# Patient Record
Sex: FEMALE | Race: WHITE | NOT HISPANIC OR LATINO | Employment: FULL TIME | URBAN - METROPOLITAN AREA
[De-identification: names, ages, dates, MRNs, and addresses within clinical notes are randomized per-mention and may not be internally consistent; named-entity substitution may affect disease eponyms.]

---

## 2017-03-07 ENCOUNTER — LAB VISIT (OUTPATIENT)
Dept: LAB | Facility: HOSPITAL | Age: 27
End: 2017-03-07
Attending: NURSE PRACTITIONER
Payer: MEDICAID

## 2017-03-07 ENCOUNTER — OFFICE VISIT (OUTPATIENT)
Dept: OBSTETRICS AND GYNECOLOGY | Facility: CLINIC | Age: 27
End: 2017-03-07
Payer: MEDICAID

## 2017-03-07 VITALS
HEIGHT: 66 IN | BODY MASS INDEX: 42.73 KG/M2 | SYSTOLIC BLOOD PRESSURE: 122 MMHG | DIASTOLIC BLOOD PRESSURE: 78 MMHG | WEIGHT: 265.88 LBS

## 2017-03-07 DIAGNOSIS — Z34.81 ENCOUNTER FOR SUPERVISION OF OTHER NORMAL PREGNANCY IN FIRST TRIMESTER: ICD-10-CM

## 2017-03-07 DIAGNOSIS — Z34.81 ENCOUNTER FOR SUPERVISION OF OTHER NORMAL PREGNANCY IN FIRST TRIMESTER: Primary | ICD-10-CM

## 2017-03-07 DIAGNOSIS — O99.211 OBESITY AFFECTING PREGNANCY IN FIRST TRIMESTER: ICD-10-CM

## 2017-03-07 LAB
ABO + RH BLD: NORMAL
BASOPHILS # BLD AUTO: 0.03 K/UL
BASOPHILS NFR BLD: 0.3 %
BLD GP AB SCN CELLS X3 SERPL QL: NORMAL
DIFFERENTIAL METHOD: ABNORMAL
EOSINOPHIL # BLD AUTO: 0.1 K/UL
EOSINOPHIL NFR BLD: 0.6 %
ERYTHROCYTE [DISTWIDTH] IN BLOOD BY AUTOMATED COUNT: 13.1 %
HCT VFR BLD AUTO: 37.6 %
HGB BLD-MCNC: 12.5 G/DL
LYMPHOCYTES # BLD AUTO: 3.1 K/UL
LYMPHOCYTES NFR BLD: 29.2 %
MCH RBC QN AUTO: 27.4 PG
MCHC RBC AUTO-ENTMCNC: 33.2 %
MCV RBC AUTO: 83 FL
MONOCYTES # BLD AUTO: 0.6 K/UL
MONOCYTES NFR BLD: 5.6 %
NEUTROPHILS # BLD AUTO: 6.9 K/UL
NEUTROPHILS NFR BLD: 64.1 %
PLATELET # BLD AUTO: 393 K/UL
PMV BLD AUTO: 9.5 FL
RBC # BLD AUTO: 4.56 M/UL
WBC # BLD AUTO: 10.77 K/UL

## 2017-03-07 PROCEDURE — 86762 RUBELLA ANTIBODY: CPT

## 2017-03-07 PROCEDURE — 86703 HIV-1/HIV-2 1 RESULT ANTBDY: CPT

## 2017-03-07 PROCEDURE — 86900 BLOOD TYPING SEROLOGIC ABO: CPT

## 2017-03-07 PROCEDURE — 87340 HEPATITIS B SURFACE AG IA: CPT

## 2017-03-07 PROCEDURE — 86850 RBC ANTIBODY SCREEN: CPT

## 2017-03-07 PROCEDURE — 86592 SYPHILIS TEST NON-TREP QUAL: CPT

## 2017-03-07 PROCEDURE — 99999 PR PBB SHADOW E&M-EST. PATIENT-LVL II: CPT | Mod: PBBFAC,,, | Performed by: NURSE PRACTITIONER

## 2017-03-07 PROCEDURE — 99203 OFFICE O/P NEW LOW 30 MIN: CPT | Mod: TH,S$PBB,, | Performed by: NURSE PRACTITIONER

## 2017-03-07 PROCEDURE — 85025 COMPLETE CBC W/AUTO DIFF WBC: CPT

## 2017-03-07 NOTE — MR AVS SNAPSHOT
"    [facility]  [address]  [place] [address]  Phone: [phone]  Fax: [phone]                  Deandra Carias   3/7/2017 2:15 PM   Office Visit    Description:  Female : 1990   Provider:  Saira Rivera NP   Department:  [facility]           Reason for Visit     Possible Pregnancy           Diagnoses this Visit        Comments    Encounter for supervision of other normal pregnancy in first trimester    -  Primary     Obesity affecting pregnancy in first trimester                To Do List           Future Appointments        Provider Department Dept Phone    3/14/2017 8:30 AM [facility] -[phone]    3/14/2017 9:15 AM Rossy Pradhan CNM Pending sale to [facility] OB/ -[phone]      Goals (5 Years of Data)     None      [facility]     [facility] Nurse [facility] Line -  Assistance  Registered nurses in the [facility] provide clinical advisement, health education, appointment booking, and other advisory services.  Call for this free service at [phone].             Medications           Message regarding Medications     Verify the changes and/or additions to your medication regime listed below are the same as discussed with your clinician today.  If any of these changes or additions are incorrect, please notify your healthcare provider.             Verify that the below list of medications is an accurate representation of the medications you are currently taking.  If none reported, the list may be blank. If incorrect, please contact your healthcare provider. Carry this list with you in case of emergency.           Current Medications     PHOSP ACID/DEXTROSE/FRUCTOSE (NAUSEA RELIEF ORAL) Take by mouth daily as needed.    PNV62/FA/OM3/DHA/EPA/FISH OIL (PRENATAL GUMMY ORAL) Take by mouth once daily.           Clinical Reference Information           Your Vitals Were     BP Height Weight Last Period BMI    122/78 5' 6" " (1.676 m) 120.6 kg (265 lb 14 oz) 02/01/2017 (Approximate) 42.91 kg/m2      Blood Pressure          Most Recent Value    BP  122/78      Allergies as of 3/7/2017     Asa [Aspirin]    Sulfa (Sulfonamide Antibiotics)      Immunizations Administered on Date of Encounter - 3/7/2017     None      Orders Placed During Today's Visit      Normal Orders This Visit    C. trachomatis/N. gonorrhoeae by AMP DNA Cervicovaginal     Liquid-based pap smear, screening     POCT urine pregnancy     Urinalysis     Urine culture     Future Labs/Procedures Expected by Expires    CBC auto differential  3/7/2017 5/6/2018    Hepatitis B surface antigen  3/7/2017 5/6/2018    HIV-1 and HIV-2 antibodies  3/7/2017 5/6/2018    RPR  3/7/2017 5/6/2018    Rubella antibody, IgG  3/7/2017 5/6/2018    Type & Screen - Ob Profile  3/7/2017 5/6/2018    US OB/GYN Procedure ([facility])  As directed 3/7/2018      Language Assistance Services     ATTENTION: Language assistance services are available, free of charge. Please call [phone].      ATENCIÓN: Si habla español, tiene a perdue disposición servicios gratuitos de asistencia lingüística. Llame al [phone].     CHÚ Ý: N?u b?n nói Ti?ng Vi?t, có các d?ch v? h? tr? ngôn ng? mi?n phí dành cho b?n. G?i s? [phone].         [facility] complies with applicable Federal civil rights laws and does not discriminate on the basis of race, color, national origin, age, disability, or sex.

## 2017-03-07 NOTE — PROGRESS NOTES
"CC: Risk assessement    Deandra Carias is a 26 y.o. female  presents for risk assessment.  LMP: Patient's last menstrual period was 2017 (approximate). Different FOB. WINDY 2017. 4 w 6 d. Last pap exam . OB h/o one term vaginal delivery.     Past Medical History:   Diagnosis Date    Abnormal Pap smear of cervix      Past Surgical History:   Procedure Laterality Date    APPENDECTOMY       Social History     Social History    Marital status: Single     Spouse name: N/A    Number of children: N/A    Years of education: N/A     Occupational History    Not on file.     Social History Main Topics    Smoking status: Never Smoker    Smokeless tobacco: Not on file    Alcohol use No    Drug use: Yes     Special: Marijuana      Comment: Pt. reports using marijuana 4 days ago 3/3/2017    Sexual activity: Yes     Partners: Male     Birth control/ protection: None     Other Topics Concern    Not on file     Social History Narrative     Family History   Problem Relation Age of Onset    Breast cancer Maternal Grandmother     Colon cancer Neg Hx     Ovarian cancer Neg Hx     Cancer Neg Hx      OB History      Para Term  AB TAB SAB Ectopic Multiple Living    2 1 1       1          /78  Ht 5' 6" (1.676 m)  Wt 120.6 kg (265 lb 14 oz)  LMP 2017 (Approximate)  BMI 42.91 kg/m2      ROS:  GENERAL: Denies weight gain or weight loss. Feeling well overall.   SKIN: Denies rash or lesions.   HEAD: Denies head injury or headache.   NODES: Denies enlarged lymph nodes.   CHEST: Denies chest pain or shortness of breath.   CARDIOVASCULAR: Denies palpitations or left sided chest pain.   ABDOMEN: No abdominal pain, constipation, diarrhea, nausea, vomiting or rectal bleeding.   URINARY: No frequency, dysuria, hematuria, or burning on urination.  REPRODUCTIVE: See HPI.   BREASTS: The patient performs breast self-examination and denies pain, lumps, or nipple discharge.   HEMATOLOGIC: " No easy bruisability or excessive bleeding.   MUSCULOSKELETAL: Denies joint pain or swelling.   NEUROLOGIC: Denies syncope or weakness.   PSYCHIATRIC: Denies depression, anxiety or mood swings.    PHYSICAL EXAM:  APPEARANCE: Obese female, in no acute distress.  AFFECT: WNL, alert and oriented x 3  SKIN: No acne or hirsutism  NECK: Neck symmetric without masses or thyromegaly  NODES: No inguinal, cervical, axillary, or femoral lymph node enlargement  CHEST: Good respiratory effect  ABDOMEN: Soft.  No tenderness or masses.   PELVIC: Normal external genitalia without lesions.  Normal hair distribution.  Adequate perineal body, normal urethral meatus.  Vagina moist and well rugated without lesions or discharge.  Cervix pink, without lesions, discharge or tenderness.    Bimanual exam shows uterus to be  5 weeks size, regular, mobile and nontender.  Adnexa without masses or tenderness.    EXTREMITIES: No edema.    1. Encounter for supervision of other normal pregnancy in first trimester  C. trachomatis/N. gonorrhoeae by AMP DNA Cervicovaginal    CBC auto differential    Hepatitis B surface antigen    HIV-1 and HIV-2 antibodies    Liquid-based pap smear, screening    POCT urine pregnancy    RPR    Rubella antibody, IgG    Type & Screen - Ob Profile    Urinalysis    Urine culture    US OB/GYN Procedure ([facility])    PLAN:  Initial labs  Initial ultrasound and visit

## 2017-03-08 LAB
AMORPH CRY UR QL COMP ASSIST: ABNORMAL
BACTERIA #/AREA URNS AUTO: ABNORMAL /HPF
BILIRUB UR QL STRIP: NEGATIVE
C TRACH DNA SPEC QL NAA+PROBE: NEGATIVE
CLARITY UR REFRACT.AUTO: ABNORMAL
COLOR UR AUTO: YELLOW
GLUCOSE UR QL STRIP: NEGATIVE
HBV SURFACE AG SERPL QL IA: NEGATIVE
HGB UR QL STRIP: NEGATIVE
HIV 1+2 AB+HIV1 P24 AG SERPL QL IA: NEGATIVE
KETONES UR QL STRIP: NEGATIVE
LEUKOCYTE ESTERASE UR QL STRIP: NEGATIVE
MICROSCOPIC COMMENT: ABNORMAL
N GONORRHOEA DNA SPEC QL NAA+PROBE: NEGATIVE
NITRITE UR QL STRIP: NEGATIVE
PH UR STRIP: 6 [PH] (ref 5–8)
PROT UR QL STRIP: NEGATIVE
RPR SER QL: NORMAL
SP GR UR STRIP: 1.02 (ref 1–1.03)
SQUAMOUS #/AREA URNS AUTO: 17 /HPF
URN SPEC COLLECT METH UR: ABNORMAL
UROBILINOGEN UR STRIP-ACNC: NEGATIVE EU/DL

## 2017-03-09 LAB
BACTERIA UR CULT: NORMAL
BACTERIA UR CULT: NORMAL

## 2017-03-10 LAB
RUBV IGG SER-ACNC: 10.4 IU/ML
RUBV IGG SER-IMP: REACTIVE

## 2017-03-14 ENCOUNTER — TELEPHONE (OUTPATIENT)
Dept: OBSTETRICS AND GYNECOLOGY | Facility: CLINIC | Age: 27
End: 2017-03-14

## 2017-03-14 NOTE — TELEPHONE ENCOUNTER
----- Message from Shruthi Banks sent at 3/14/2017  8:20 AM CDT -----  Contact: Pt   Pt called and stated she was returning a call to the nurse. She stated that she is in route to her appt and is on traffic. She can be reached at [phone] (home).    Thanks,  TF

## 2017-03-14 NOTE — TELEPHONE ENCOUNTER
Patient was rescheduled for her newob appt per Rossy pt was to early for dating ultrasound, she is only 5 weeks, needs to be at least 8 weeks for an accurate reading.

## 2017-04-11 ENCOUNTER — INITIAL PRENATAL (OUTPATIENT)
Dept: OBSTETRICS AND GYNECOLOGY | Facility: CLINIC | Age: 27
End: 2017-04-11
Payer: MEDICAID

## 2017-04-11 ENCOUNTER — PROCEDURE VISIT (OUTPATIENT)
Dept: OBSTETRICS AND GYNECOLOGY | Facility: CLINIC | Age: 27
End: 2017-04-11
Payer: MEDICAID

## 2017-04-11 ENCOUNTER — TELEPHONE (OUTPATIENT)
Dept: OBSTETRICS AND GYNECOLOGY | Facility: CLINIC | Age: 27
End: 2017-04-11

## 2017-04-11 VITALS
WEIGHT: 262.13 LBS | DIASTOLIC BLOOD PRESSURE: 74 MMHG | BODY MASS INDEX: 42.31 KG/M2 | SYSTOLIC BLOOD PRESSURE: 122 MMHG

## 2017-04-11 DIAGNOSIS — Z34.81 ENCOUNTER FOR SUPERVISION OF OTHER NORMAL PREGNANCY IN FIRST TRIMESTER: ICD-10-CM

## 2017-04-11 PROCEDURE — 99202 OFFICE O/P NEW SF 15 MIN: CPT | Mod: TH,S$PBB,, | Performed by: ADVANCED PRACTICE MIDWIFE

## 2017-04-11 PROCEDURE — 99212 OFFICE O/P EST SF 10 MIN: CPT | Mod: PBBFAC,25 | Performed by: ADVANCED PRACTICE MIDWIFE

## 2017-04-11 PROCEDURE — 76801 OB US < 14 WKS SINGLE FETUS: CPT | Mod: 26,S$PBB,, | Performed by: OBSTETRICS & GYNECOLOGY

## 2017-04-11 PROCEDURE — 99999 PR PBB SHADOW E&M-EST. PATIENT-LVL II: CPT | Mod: PBBFAC,,, | Performed by: ADVANCED PRACTICE MIDWIFE

## 2017-04-11 NOTE — MR AVS SNAPSHOT
[facility]  [address]  [place] [address]  Phone: [phone]  Fax: [phone]                  Deandra Carias   2017 9:30 AM   Initial Prenatal    Description:  Female : 1990   Provider:  Rossy Pradhan CNM   Department:  [facility]           Reason for Visit     Initial Prenatal Visit           Diagnoses this Visit        Comments    Evaluate anatomy not seen on prior sonogram    -  Primary            To Do List           Future Appointments        Provider Department Dept Phone    2017 9:00 AM ULTRASOUND, OB[facility]N [facility]/ -[phone]    2017 9:45 AM [facility] -[phone]      Goals (5 Years of Data)     None      [facility]     [facility] Care Line -  Assistance  Unless otherwise directed by your provider, please contact [facility], our nurse care line that is available for  assistance.     Registered nurses in the [facility] provide: appointment scheduling, clinical advisement, health education, and other advisory services.  Call: [phone] (toll free)               Medications           Message regarding Medications     Verify the changes and/or additions to your medication regime listed below are the same as discussed with your clinician today.  If any of these changes or additions are incorrect, please notify your healthcare provider.             Verify that the below list of medications is an accurate representation of the medications you are currently taking.  If none reported, the list may be blank. If incorrect, please contact your healthcare provider. Carry this list with you in case of emergency.           Current Medications     PHOSP ACID/DEXTROSE/FRUCTOSE (NAUSEA RELIEF ORAL) Take by mouth daily as needed.    PNV62/FA/OM3/DHA/EPA/FISH OIL (PRENATAL GUMMY ORAL) Take by mouth once daily.           Clinical Reference Information           Prenatal Vitals      Enc. Date GA Prenatal Vitals Prenatal Pulse Pain Level Urine Albumin/Glucose Edema Presentation Dilation/Effacement/Station    4/11/17 9w6d 122/74 / 118.9 kg (262 lb 2 oz)  / us            Your Vitals Were     BP Weight Last Period BMI       122/74 118.9 kg (262 lb 2 oz) 02/01/2017 (Approximate) 42.31 kg/m2       Allergies as of 4/11/2017     Asa [Aspirin]    Sulfa (Sulfonamide Antibiotics)      Immunizations Administered on Date of Encounter - 4/11/2017     None      Orders Placed During Today's Visit     Future Labs/Procedures Expected by Expires    US OB/GYN Procedure ([facility])  As directed 4/11/2018      Language Assistance Services     ATTENTION: Language assistance services are available, free of charge. Please call [phone].      ATENCIÓN: Si habla ewaañol, tiene a perdue disposición servicios gratuitos de asistencia lingüística. Llame al [phone].     CHÚ Ý: N?u b?n nói Ti?ng Vi?t, có các d?ch v? h? tr? ngôn ng? mi?n phí dành cho b?n. G?i s? [phone].         [facility] complies with applicable Federal civil rights laws and does not discriminate on the basis of race, color, national origin, age, disability, or sex.

## 2017-04-11 NOTE — TELEPHONE ENCOUNTER
Attempted to call pt, left message for pt to return call.  Pt needs to know that ultrasound was moved to May 3rd at the [facility] for further imaging with MFM.

## 2017-04-11 NOTE — PROGRESS NOTES
Oriented to our practice. [place] book and blue bag info reviewed. zika and dietary recommendations made. Works in a kitchen, [employer], food eversions present. Reviewed US and need for follow up to further assess anatomical structures. Will wait for Dr. Armendariz's recommendations. al

## 2017-04-11 NOTE — TELEPHONE ENCOUNTER
----- Message from Barrington Deluna sent at 4/11/2017 11:58 AM CDT -----  Contact: Patient  Pt missed a call and would like a return call @ ..[phone] (home) Thank you/NH

## 2017-04-11 NOTE — TELEPHONE ENCOUNTER
----- Message from Karishma Byers sent at 4/11/2017 11:24 AM CDT -----  Patient is returning your call regarding your her follow up appointment.   Call her at [phone].                                                   garcia

## 2017-04-28 ENCOUNTER — PATIENT MESSAGE (OUTPATIENT)
Dept: OBSTETRICS AND GYNECOLOGY | Facility: CLINIC | Age: 27
End: 2017-04-28

## 2017-05-01 ENCOUNTER — TELEPHONE (OUTPATIENT)
Dept: OBSTETRICS AND GYNECOLOGY | Facility: CLINIC | Age: 27
End: 2017-05-01

## 2017-05-01 NOTE — TELEPHONE ENCOUNTER
----- Message from Aleida Duncan MA sent at 5/1/2017 11:16 AM CDT -----  Contact: pt- [phone]      ----- Message -----     From: Renée Marin     Sent: 5/1/2017   9:13 AM       To: Kala SAN Staff    Pt cx appt for U/S this Wednesday but later realized reason for appt.  Pt is concerned something may be wrong with baby and wants to speak with nurse.

## 2017-05-01 NOTE — TELEPHONE ENCOUNTER
Spoke with pt  States she canceled MFM appt because she wasn't sure why the appt was originally scheduled as well as a f/u.     Pt request to r/s mfm and f/u back for originally dates. appts changed Patient verbalized understanding

## 2017-05-03 ENCOUNTER — OFFICE VISIT (OUTPATIENT)
Dept: OBSTETRICS AND GYNECOLOGY | Facility: CLINIC | Age: 27
End: 2017-05-03
Payer: MEDICAID

## 2017-05-03 DIAGNOSIS — O35.02X0 ANENCEPHALY OF FETUS AFFECTING SINGLETON PREGNANCY: ICD-10-CM

## 2017-05-03 PROCEDURE — 76805 OB US >/= 14 WKS SNGL FETUS: CPT | Mod: PBBFAC,PO | Performed by: OBSTETRICS & GYNECOLOGY

## 2017-05-03 PROCEDURE — 99203 OFFICE O/P NEW LOW 30 MIN: CPT | Mod: GT,25,TH,S$PBB | Performed by: OBSTETRICS & GYNECOLOGY

## 2017-05-03 PROCEDURE — 76805 OB US >/= 14 WKS SNGL FETUS: CPT | Mod: 26,S$PBB,, | Performed by: OBSTETRICS & GYNECOLOGY

## 2017-05-03 NOTE — PROGRESS NOTES
Chief complaint: Fetal anomaly    26 y.o. 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 There is no history of Colon cancer, Ovarian cancer, or Cancer.    Social history: reports that she has never smoked. She does not have any smokeless tobacco history on file. She reports that she uses illicit drugs, including Marijuana. She reports that she does not drink alcohol.    A detailed fetal anatomical ultrasound was completed today.  See details in imaging section of EPIC.  Although very early in pregnancy, fetal anencephaly is present.        She was seen for counseling due to fetal anencephaly. No other structural abnormalities were noted but survey was incomplete due to early gestation. Pleasesee the ultrasound report for complete details.  We discussed that anencephaly is typically a multifactorial condition, with both environmental and genetic  influences. Rare cases have been reported to be associated with chromosome abnormality. At this time,  invasive testing is not recommended. Recurrence risks are 3-5% for any open neural tube defect, including  anencephaly. Anencephaly is a lethal condition, with approximately 50% of cases being stillborn and the  remaining dying shortly after delivery. We discussed that although a large part of the cerebrum has been  destroyed, the brain stem remains which is why the fetus continues to move.    We discussed that she has the options of continuing the pregnancy  with expectant management or of termination. At this time,  she would like to terminate the pregnancy.     We discussed that in ALL future pregnancies, she should take 4.0 mg of folic acid PRIOR to and DURING pregnancy. We discussed that since many pregnancies are unplanned, she  should make folic acid a part of her daily routine, as supplementation has been shown to decrease the risk for spina bifida by 75%. She expressed understanding.    _ The approximate physician face-to-face time was 30 minutes. The majority of the time (>50%) was spent on counseling of the patient or coordination of care.  Thank you for allowing us to assist in the care of your patient. If you have any questions, please do not hesitate to contact our office.

## 2017-05-04 ENCOUNTER — TELEPHONE (OUTPATIENT)
Dept: OBSTETRICS AND GYNECOLOGY | Facility: CLINIC | Age: 27
End: 2017-05-04

## 2017-05-04 NOTE — TELEPHONE ENCOUNTER
----- Message from Jon Lyons sent at 5/4/2017 12:19 PM CDT -----  Contact: same  Patient called in and would like a call back from nurse or doctor regarding the ultrasound results on the baby as she has questions.    Patient call back number is [phone]

## 2017-05-04 NOTE — TELEPHONE ENCOUNTER
Attempted to contact patient re: u/s results , no answer.  Left patient a voicemail message to call the clinic back.

## 2017-05-05 ENCOUNTER — TELEPHONE (OUTPATIENT)
Dept: OBSTETRICS AND GYNECOLOGY | Facility: CLINIC | Age: 27
End: 2017-05-05

## 2017-05-05 NOTE — TELEPHONE ENCOUNTER
----- Message from Jona Vieyra sent at 5/5/2017  8:16 AM CDT -----  Contact: [phone]   Returning call, please call back @ [phone].  Thanks-[facility]

## 2017-05-05 NOTE — TELEPHONE ENCOUNTER
Spoke with the patient and she is having a hard time with making a final decision to terminate the pregnancy but she knows she can't handle carrying the baby to term knowing it will die. Patient called the numbers we provided her with to set the termination but she has a lot of questions still about the diagnosis and the  procedure that these places talked to her about. I informed the patient I would forward this message to Dr Delgado and have him give her a call back to further discuss. Patient verbalized understanding, Crystal

## 2019-07-10 NOTE — TELEPHONE ENCOUNTER
----- Message from Melissa Payton LPN sent at 5/4/2017  4:39 PM CDT -----  Contact: same      ----- Message -----     From: Jon Lyons     Sent: 5/4/2017  12:19 PM       To: Carolynn Blair Staff    Patient called in and would like a call back from nurse or doctor regarding the ultrasound results on the baby as she has questions.    Patient call back number is [phone]   I have reviewed and confirmed nurses' notes...